# Patient Record
Sex: FEMALE | Race: WHITE | NOT HISPANIC OR LATINO
[De-identification: names, ages, dates, MRNs, and addresses within clinical notes are randomized per-mention and may not be internally consistent; named-entity substitution may affect disease eponyms.]

---

## 2017-01-27 ENCOUNTER — RECORD ABSTRACTING (OUTPATIENT)
Age: 47
End: 2017-01-27

## 2017-01-27 DIAGNOSIS — D23.5 OTHER BENIGN NEOPLASM OF SKIN OF TRUNK: ICD-10-CM

## 2017-01-27 DIAGNOSIS — L03.211 CELLULITIS OF FACE: ICD-10-CM

## 2017-01-27 DIAGNOSIS — F17.200 NICOTINE DEPENDENCE, UNSPECIFIED, UNCOMPLICATED: ICD-10-CM

## 2017-01-27 PROBLEM — Z00.00 ENCOUNTER FOR PREVENTIVE HEALTH EXAMINATION: Status: ACTIVE | Noted: 2017-01-27

## 2017-09-23 ENCOUNTER — EMERGENCY (EMERGENCY)
Facility: HOSPITAL | Age: 47
LOS: 0 days | Discharge: HOME | End: 2017-09-23
Admitting: INTERNAL MEDICINE

## 2017-09-23 DIAGNOSIS — N61.1 ABSCESS OF THE BREAST AND NIPPLE: ICD-10-CM

## 2017-09-23 DIAGNOSIS — Z87.891 PERSONAL HISTORY OF NICOTINE DEPENDENCE: ICD-10-CM

## 2017-10-25 ENCOUNTER — EMERGENCY (EMERGENCY)
Facility: HOSPITAL | Age: 47
LOS: 0 days | Discharge: HOME | End: 2017-10-25

## 2017-10-25 DIAGNOSIS — T25.221A BURN OF SECOND DEGREE OF RIGHT FOOT, INITIAL ENCOUNTER: ICD-10-CM

## 2017-10-25 DIAGNOSIS — Z23 ENCOUNTER FOR IMMUNIZATION: ICD-10-CM

## 2017-10-25 DIAGNOSIS — Z88.5 ALLERGY STATUS TO NARCOTIC AGENT: ICD-10-CM
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
## 2017-10-25 DIAGNOSIS — Z87.891 PERSONAL HISTORY OF NICOTINE DEPENDENCE: ICD-10-CM

## 2017-10-25 DIAGNOSIS — T21.12XA BURN OF FIRST DEGREE OF ABDOMINAL WALL, INITIAL ENCOUNTER: ICD-10-CM
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
## 2020-07-08 ENCOUNTER — RESULT REVIEW (OUTPATIENT)
Age: 50
End: 2020-07-08

## 2020-08-28 ENCOUNTER — APPOINTMENT (OUTPATIENT)
Dept: NEUROLOGY | Facility: CLINIC | Age: 50
End: 2020-08-28
Payer: COMMERCIAL

## 2020-08-28 VITALS
BODY MASS INDEX: 33.82 KG/M2 | TEMPERATURE: 97.9 F | HEIGHT: 65 IN | WEIGHT: 203 LBS | HEART RATE: 108 BPM | SYSTOLIC BLOOD PRESSURE: 120 MMHG | OXYGEN SATURATION: 97 % | DIASTOLIC BLOOD PRESSURE: 76 MMHG

## 2020-08-28 DIAGNOSIS — E78.5 HYPERLIPIDEMIA, UNSPECIFIED: ICD-10-CM

## 2020-08-28 DIAGNOSIS — Z82.49 FAMILY HISTORY OF ISCHEMIC HEART DISEASE AND OTHER DISEASES OF THE CIRCULATORY SYSTEM: ICD-10-CM

## 2020-08-28 DIAGNOSIS — F17.210 NICOTINE DEPENDENCE, CIGARETTES, UNCOMPLICATED: ICD-10-CM

## 2020-08-28 DIAGNOSIS — E11.9 TYPE 2 DIABETES MELLITUS W/OUT COMPLICATIONS: ICD-10-CM

## 2020-08-28 DIAGNOSIS — Z83.3 FAMILY HISTORY OF DIABETES MELLITUS: ICD-10-CM

## 2020-08-28 DIAGNOSIS — E03.9 HYPOTHYROIDISM, UNSPECIFIED: ICD-10-CM

## 2020-08-28 DIAGNOSIS — E04.2 NONTOXIC MULTINODULAR GOITER: ICD-10-CM

## 2020-08-28 DIAGNOSIS — Z82.0 FAMILY HISTORY OF EPILEPSY AND OTHER DISEASES OF THE NERVOUS SYSTEM: ICD-10-CM

## 2020-08-28 PROCEDURE — 99204 OFFICE O/P NEW MOD 45 MIN: CPT

## 2020-08-28 RX ORDER — SULFAMETHOXAZOLE AND TRIMETHOPRIM 800; 160 MG/1; MG/1
TABLET ORAL
Refills: 0 | Status: DISCONTINUED | COMMUNITY
End: 2020-08-28

## 2020-08-28 RX ORDER — CLINDAMYCIN HYDROCHLORIDE 300 MG/1
CAPSULE ORAL
Refills: 0 | Status: DISCONTINUED | COMMUNITY
End: 2020-08-28

## 2020-08-28 NOTE — REVIEW OF SYSTEMS
[Feeling Poorly] : feeling poorly [Feeling Tired] : feeling tired [Recent Weight Loss (___ Lbs)] : recent [unfilled] ~Ulb weight loss [Confused or Disoriented] : confusion [Decr. Concentrating Ability] : decreased concentrating ability [Memory Lapses or Loss] : memory loss [Vertigo] : vertigo [Migraine Headache] : migraine headaches [Tension Headache] : tension-type headaches [Sleep Disturbances] : sleep disturbances [Heart Rate Is Fast] : fast heart rate [Vomiting] : vomiting [Heartburn] : heartburn [Joint Pain] : joint pain [Hot Flashes] : hot flashes [Fever] : no fever [Chills] : no chills [Recent Weight Gain (___ Lbs)] : no recent weight gain [Arm Weakness] : no arm weakness [Facial Weakness] : no facial weakness [Leg Weakness] : no leg weakness [Poor Coordination] : good coordination [Hand Weakness] : no hand weakness [Numbness] : no numbness [Tingling] : no tingling [Seizures] : no convulsions [Fainting] : no fainting [Lightheadedness] : no lightheadedness [Dizziness] : no dizziness [Cluster Headache] : no cluster headache [Frequent Falls] : not falling [Difficulty Walking] : no difficulty walking [Depression] : no depression [Anxiety] : no anxiety [Suicidal] : not suicidal [Earache] : no earache [Eye Pain] : no eye pain [Eyesight Problems] : no eyesight problems [Loss Of Hearing] : no hearing loss [Heart Rate Is Slow] : the heart rate was not slow [Chest Pain] : no chest pain [Shortness Of Breath] : no shortness of breath [Palpitations] : no palpitations [Wheezing] : no wheezing [Abdominal Pain] : no abdominal pain [Constipation] : no constipation [Cough] : no cough [Melena] : no melena [Dysuria] : no dysuria [Diarrhea] : no diarrhea [Joint Swelling] : no joint swelling [Incontinence] : no incontinence [Joint Stiffness] : no joint stiffness [Skin Lesions] : no skin lesions [Skin Wound] : no skin wound [Muscle Weakness] : no muscle weakness [Easy Bruising] : no tendency for easy bruising [Easy Bleeding] : no tendency for easy bleeding [FreeTextEntry9] : ankles [de-identified] : falls asleep, but gets up a lot.  Snores on back.  Sleep apnea test is negative [de-identified] : states "ADD big time, all life, noties more as she gets older" [de-identified] : perimenopausal x 3 years

## 2020-08-28 NOTE — ASSESSMENT
[FreeTextEntry1] : Vertigo and migraines.  I was able to produce positional vertigo today and treated her for LEFT BPPV.  This surprised her because she had always associated the symptoms as occuring when she turn from left to right side.  I treated the most symptomatic position today.  \par \par Other potential causes of vertigo would include migraine or CNS disorder such as demyelinating disease.  She makes reference to abnormal brain MRI years ago so will recommend MRI brain w/wo contrast.  She is unlikely to tolerate supine MRI so I've requested stand up MRI.\par \par Return in 3 months.  Sooner if needed for example if positional vertigo is worse may return for Epley in a couple of weeks.

## 2020-08-28 NOTE — PHYSICAL EXAM
[FreeTextEntry1] : Recent and remote memory, general fund of knowledge, attention, concentration, and language function were intact.  Pupils are equal, round and reactive to light and accommodation.  Funduscopic exam did not show any papilledema.  Visual fields are intact to confrontation.  Extraocular movements are full.  There is no nystagmus.  The facial muscles are symmetric.  Facial sensation is intact to light touch, temperature, and pinprick.  Hearing is intact to finger rub bilaterally.  Tongue is midline.  Palate elevates symmetrically.  Neck is supple.  There is no meningismus.  Shoulder shrugs are symmetric.  Motor exam demonstrates 5/5 strength in the proximal and distal muscles of the upper and lower extremities.  Tone and bulk were normal.  There is no pronator drift.  Reflexes are 2+ bilaterally in the biceps, triceps, brachioradialis, patellae and ankles.  Toes are downgoing.  There is no clonus.  Coordination demonstrates normal finger-to-nose, heel-to-shin and rapid alternating movements.  Gait demonstrates normal heel and toe walk.  Tandem gait is normal.  Sensory exam, normal light touch, pinprick, vibration sensation in upper and lower extremities. \par \par [place] positive with head tilted back and to right though I didn't see rotatory nystagmus with that.\par [place] was more strongly positive with head tilted back and to the left and prominent nystagmus seen in that position.\par \par Horizontal canal maneuvers were negative.\par \par \par The patient is well-developed, well-nourished female in no acute distress.  Cardiac exam demonstrates a regular rate and rhythm.  No murmurs.  Carotids are 2+ bilaterally.  No bruits.  Abdomen is soft, nontender, and nondistended.  Bowel sounds are present.  Extremities showed no clubbing, cyanosis or edema. \par \par \par

## 2020-08-28 NOTE — HISTORY OF PRESENT ILLNESS
[FreeTextEntry1] : Pt is 49 yo RH female stating last 1.5 years nonstop spinning then stopped a few days ago.  States head trauma.  1992 fell on head, dazed, large bump on head.  1994 saw neurologist and told scar tissue in center of brain.  Mother states she had vomiting every morning.  States when lays on right worse. \par \par Taking bonine for vertigo, it stopped working.  She has migraine depending on her sleeping, can't see, phono/phonophobia, N/V.  Frequency not often now. \par \par Mother states she had diabetes and put on diet and since that is doing better (endocrinologist [address])   Lumps in neck dx'd with low functionin thyroid and put on levoxy and on diabetic diet - high protein, low carb.  Losing weight 10 lbs over 2 weeks she has been on it.\par \par

## 2020-11-20 ENCOUNTER — APPOINTMENT (OUTPATIENT)
Dept: NEUROLOGY | Facility: CLINIC | Age: 50
End: 2020-11-20

## 2021-07-30 ENCOUNTER — EMERGENCY (EMERGENCY)
Facility: HOSPITAL | Age: 51
LOS: 0 days | Discharge: HOME | End: 2021-07-30
Attending: STUDENT IN AN ORGANIZED HEALTH CARE EDUCATION/TRAINING PROGRAM | Admitting: STUDENT IN AN ORGANIZED HEALTH CARE EDUCATION/TRAINING PROGRAM
Payer: COMMERCIAL

## 2021-07-30 VITALS
OXYGEN SATURATION: 99 % | RESPIRATION RATE: 20 BRPM | HEART RATE: 91 BPM | TEMPERATURE: 97 F | DIASTOLIC BLOOD PRESSURE: 89 MMHG | SYSTOLIC BLOOD PRESSURE: 159 MMHG

## 2021-07-30 DIAGNOSIS — E11.9 TYPE 2 DIABETES MELLITUS WITHOUT COMPLICATIONS: ICD-10-CM

## 2021-07-30 DIAGNOSIS — R10.13 EPIGASTRIC PAIN: ICD-10-CM

## 2021-07-30 DIAGNOSIS — R11.2 NAUSEA WITH VOMITING, UNSPECIFIED: ICD-10-CM

## 2021-07-30 DIAGNOSIS — E03.9 HYPOTHYROIDISM, UNSPECIFIED: ICD-10-CM

## 2021-07-30 DIAGNOSIS — R63.8 OTHER SYMPTOMS AND SIGNS CONCERNING FOOD AND FLUID INTAKE: ICD-10-CM

## 2021-07-30 DIAGNOSIS — F17.200 NICOTINE DEPENDENCE, UNSPECIFIED, UNCOMPLICATED: ICD-10-CM

## 2021-07-30 LAB
ALBUMIN SERPL ELPH-MCNC: 4.3 G/DL — SIGNIFICANT CHANGE UP (ref 3.5–5.2)
ALP SERPL-CCNC: 66 U/L — SIGNIFICANT CHANGE UP (ref 30–115)
ALT FLD-CCNC: 22 U/L — SIGNIFICANT CHANGE UP (ref 0–41)
ANION GAP SERPL CALC-SCNC: 10 MMOL/L — SIGNIFICANT CHANGE UP (ref 7–14)
APPEARANCE UR: CLEAR — SIGNIFICANT CHANGE UP
AST SERPL-CCNC: 14 U/L — SIGNIFICANT CHANGE UP (ref 0–41)
BASE EXCESS BLDV CALC-SCNC: 4.4 MMOL/L — HIGH (ref -2–2)
BASOPHILS # BLD AUTO: 0.05 K/UL — SIGNIFICANT CHANGE UP (ref 0–0.2)
BASOPHILS NFR BLD AUTO: 0.5 % — SIGNIFICANT CHANGE UP (ref 0–1)
BILIRUB SERPL-MCNC: 0.3 MG/DL — SIGNIFICANT CHANGE UP (ref 0.2–1.2)
BILIRUB UR-MCNC: NEGATIVE — SIGNIFICANT CHANGE UP
BUN SERPL-MCNC: 19 MG/DL — SIGNIFICANT CHANGE UP (ref 10–20)
CA-I SERPL-SCNC: 1.19 MMOL/L — SIGNIFICANT CHANGE UP (ref 1.12–1.3)
CALCIUM SERPL-MCNC: 9.2 MG/DL — SIGNIFICANT CHANGE UP (ref 8.5–10.1)
CHLORIDE SERPL-SCNC: 103 MMOL/L — SIGNIFICANT CHANGE UP (ref 98–110)
CO2 SERPL-SCNC: 25 MMOL/L — SIGNIFICANT CHANGE UP (ref 17–32)
COLOR SPEC: SIGNIFICANT CHANGE UP
CREAT SERPL-MCNC: 0.5 MG/DL — LOW (ref 0.7–1.5)
DIFF PNL FLD: NEGATIVE — SIGNIFICANT CHANGE UP
EOSINOPHIL # BLD AUTO: 0.07 K/UL — SIGNIFICANT CHANGE UP (ref 0–0.7)
EOSINOPHIL NFR BLD AUTO: 0.6 % — SIGNIFICANT CHANGE UP (ref 0–8)
GAS PNL BLDV: 141 MMOL/L — SIGNIFICANT CHANGE UP (ref 136–145)
GAS PNL BLDV: SIGNIFICANT CHANGE UP
GLUCOSE SERPL-MCNC: 97 MG/DL — SIGNIFICANT CHANGE UP (ref 70–99)
GLUCOSE UR QL: NEGATIVE — SIGNIFICANT CHANGE UP
HCG SERPL QL: NEGATIVE — SIGNIFICANT CHANGE UP
HCO3 BLDV-SCNC: 29 MMOL/L — SIGNIFICANT CHANGE UP (ref 22–29)
HCT VFR BLD CALC: 43 % — SIGNIFICANT CHANGE UP (ref 37–47)
HCT VFR BLDA CALC: 44.5 % — HIGH (ref 34–44)
HGB BLD CALC-MCNC: 14.5 G/DL — SIGNIFICANT CHANGE UP (ref 14–18)
HGB BLD-MCNC: 14.2 G/DL — SIGNIFICANT CHANGE UP (ref 12–16)
IMM GRANULOCYTES NFR BLD AUTO: 0.5 % — HIGH (ref 0.1–0.3)
KETONES UR-MCNC: NEGATIVE — SIGNIFICANT CHANGE UP
LACTATE BLDV-MCNC: 0.6 MMOL/L — SIGNIFICANT CHANGE UP (ref 0.5–1.6)
LEUKOCYTE ESTERASE UR-ACNC: NEGATIVE — SIGNIFICANT CHANGE UP
LIDOCAIN IGE QN: 35 U/L — SIGNIFICANT CHANGE UP (ref 7–60)
LYMPHOCYTES # BLD AUTO: 2.59 K/UL — SIGNIFICANT CHANGE UP (ref 1.2–3.4)
LYMPHOCYTES # BLD AUTO: 23.8 % — SIGNIFICANT CHANGE UP (ref 20.5–51.1)
MCHC RBC-ENTMCNC: 27.4 PG — SIGNIFICANT CHANGE UP (ref 27–31)
MCHC RBC-ENTMCNC: 33 G/DL — SIGNIFICANT CHANGE UP (ref 32–37)
MCV RBC AUTO: 83 FL — SIGNIFICANT CHANGE UP (ref 81–99)
MONOCYTES # BLD AUTO: 0.87 K/UL — HIGH (ref 0.1–0.6)
MONOCYTES NFR BLD AUTO: 8 % — SIGNIFICANT CHANGE UP (ref 1.7–9.3)
NEUTROPHILS # BLD AUTO: 7.26 K/UL — HIGH (ref 1.4–6.5)
NEUTROPHILS NFR BLD AUTO: 66.6 % — SIGNIFICANT CHANGE UP (ref 42.2–75.2)
NITRITE UR-MCNC: NEGATIVE — SIGNIFICANT CHANGE UP
NRBC # BLD: 0 /100 WBCS — SIGNIFICANT CHANGE UP (ref 0–0)
PCO2 BLDV: 44 MMHG — SIGNIFICANT CHANGE UP (ref 41–51)
PH BLDV: 7.44 — HIGH (ref 7.26–7.43)
PH UR: 6.5 — SIGNIFICANT CHANGE UP (ref 5–8)
PLATELET # BLD AUTO: 251 K/UL — SIGNIFICANT CHANGE UP (ref 130–400)
PO2 BLDV: 61 MMHG — HIGH (ref 20–40)
POTASSIUM BLDV-SCNC: 3.7 MMOL/L — SIGNIFICANT CHANGE UP (ref 3.3–5.6)
POTASSIUM SERPL-MCNC: 4 MMOL/L — SIGNIFICANT CHANGE UP (ref 3.5–5)
POTASSIUM SERPL-SCNC: 4 MMOL/L — SIGNIFICANT CHANGE UP (ref 3.5–5)
PROT SERPL-MCNC: 6.4 G/DL — SIGNIFICANT CHANGE UP (ref 6–8)
PROT UR-MCNC: SIGNIFICANT CHANGE UP
RBC # BLD: 5.18 M/UL — SIGNIFICANT CHANGE UP (ref 4.2–5.4)
RBC # FLD: 13.6 % — SIGNIFICANT CHANGE UP (ref 11.5–14.5)
SAO2 % BLDV: 93 % — SIGNIFICANT CHANGE UP
SODIUM SERPL-SCNC: 138 MMOL/L — SIGNIFICANT CHANGE UP (ref 135–146)
SP GR SPEC: >1.05 (ref 1.01–1.03)
TROPONIN T SERPL-MCNC: <0.01 NG/ML — SIGNIFICANT CHANGE UP
UROBILINOGEN FLD QL: SIGNIFICANT CHANGE UP
WBC # BLD: 10.89 K/UL — HIGH (ref 4.8–10.8)
WBC # FLD AUTO: 10.89 K/UL — HIGH (ref 4.8–10.8)

## 2021-07-30 PROCEDURE — 93010 ELECTROCARDIOGRAM REPORT: CPT

## 2021-07-30 PROCEDURE — 99285 EMERGENCY DEPT VISIT HI MDM: CPT

## 2021-07-30 PROCEDURE — 74177 CT ABD & PELVIS W/CONTRAST: CPT | Mod: 26,MA

## 2021-07-30 RX ORDER — SUCRALFATE 1 G
1 TABLET ORAL ONCE
Refills: 0 | Status: COMPLETED | OUTPATIENT
Start: 2021-07-30 | End: 2021-07-30

## 2021-07-30 RX ORDER — FAMOTIDINE 10 MG/ML
20 INJECTION INTRAVENOUS ONCE
Refills: 0 | Status: COMPLETED | OUTPATIENT
Start: 2021-07-30 | End: 2021-07-30

## 2021-07-30 RX ORDER — PANTOPRAZOLE SODIUM 20 MG/1
40 TABLET, DELAYED RELEASE ORAL ONCE
Refills: 0 | Status: COMPLETED | OUTPATIENT
Start: 2021-07-30 | End: 2021-07-30

## 2021-07-30 RX ORDER — METOCLOPRAMIDE HCL 10 MG
10 TABLET ORAL ONCE
Refills: 0 | Status: COMPLETED | OUTPATIENT
Start: 2021-07-30 | End: 2021-07-30

## 2021-07-30 RX ORDER — PANTOPRAZOLE SODIUM 20 MG/1
1 TABLET, DELAYED RELEASE ORAL
Qty: 14 | Refills: 0
Start: 2021-07-30 | End: 2021-08-12

## 2021-07-30 RX ORDER — ONDANSETRON 8 MG/1
4 TABLET, FILM COATED ORAL ONCE
Refills: 0 | Status: COMPLETED | OUTPATIENT
Start: 2021-07-30 | End: 2021-07-30

## 2021-07-30 RX ORDER — SODIUM CHLORIDE 9 MG/ML
2000 INJECTION, SOLUTION INTRAVENOUS ONCE
Refills: 0 | Status: COMPLETED | OUTPATIENT
Start: 2021-07-30 | End: 2021-07-30

## 2021-07-30 RX ORDER — SUCRALFATE 1 G
10 TABLET ORAL
Qty: 500 | Refills: 0
Start: 2021-07-30

## 2021-07-30 RX ADMIN — Medication 1 GRAM(S): at 17:07

## 2021-07-30 RX ADMIN — SODIUM CHLORIDE 2000 MILLILITER(S): 9 INJECTION, SOLUTION INTRAVENOUS at 13:54

## 2021-07-30 RX ADMIN — Medication 10 MILLIGRAM(S): at 16:29

## 2021-07-30 RX ADMIN — FAMOTIDINE 20 MILLIGRAM(S): 10 INJECTION INTRAVENOUS at 13:54

## 2021-07-30 RX ADMIN — PANTOPRAZOLE SODIUM 40 MILLIGRAM(S): 20 TABLET, DELAYED RELEASE ORAL at 17:08

## 2021-07-30 RX ADMIN — ONDANSETRON 4 MILLIGRAM(S): 8 TABLET, FILM COATED ORAL at 17:06

## 2021-07-30 NOTE — ED ADULT NURSE NOTE - OBJECTIVE STATEMENT
C/o abd pain with nausea and vomiting x 3 days. Pt states hx of diabetes, noncompliant with medication. Also c/o decrease oral intake.

## 2021-07-30 NOTE — ED PROVIDER NOTE - OBJECTIVE STATEMENT
50 yo f hx DM, hypothyroid (med noncompliant for past month)  pt presents for eval of AP/N/V. sx started 3 days ago as epigastric ap. the following day, pt started feeling nauseous and vomited. sx intermittent. pt w/ decreasesd PO intake 2/2 sx. no f/c/urinary complaints. no cp or sob. no hx abd surgeries

## 2021-07-30 NOTE — ED PROVIDER NOTE - PATIENT PORTAL LINK FT
You can access the FollowMyHealth Patient Portal offered by [facility] by registering at the following website: [email or web address]. By joining [facility]’s FollowMyHealth portal, you will also be able to view your health information using other applications (apps) compatible with our system.

## 2021-07-30 NOTE — ED ADULT TRIAGE NOTE - CHIEF COMPLAINT QUOTE
"I've had bad abdominal pain since Tuesday and I've been vomiting since Wednesday. It's all bile." Pt's family member states pt has been off DM and Thyroid medication for over 1 month.

## 2021-07-30 NOTE — ED PROVIDER NOTE - CLINICAL SUMMARY MEDICAL DECISION MAKING FREE TEXT BOX
pt w/ epigastric AP, n,v. ed w/u showing gastritis/pud. no e/o other intra-abd pathology, acs r/o, pt tolerating PO. will dc w/ gi f/u

## 2021-07-30 NOTE — ED PROVIDER NOTE - CARE PROVIDER_API CALL
Dali Gibson (MD)  Gastroenterology  [address] [place] [address]  Phone: [phone]  Fax: [phone]  Follow Up Time:

## 2021-07-30 NOTE — ED PROVIDER NOTE - PHYSICAL EXAMINATION
PHYSICAL EXAM:    Constitutional: awake, alert, NAD  Eyes: EOMI, no conj injection  HENT: NC AT  Respiratory: no respiratory distress, breath sounds equal b/l, no wheezing, rhonchi or stridor.   Cardiovascular: RRR nml S1S2  Gastrointestinal: soft, no masses, mild epigastric ttp, nondistended. No guarding or rebound. negative murphys  Extremities: no peripheral edema  Neurological: AAOx3, CN II-XII grossly intact, no focal numbness or weakness  Skin: no rash  Musculoskeletal: no gross deformity

## 2021-07-30 NOTE — ED PROVIDER NOTE - PROGRESS NOTE DETAILS
r/o gastritis, pancreatitis, hb disease, colitis, less likely atypical acs- will get screening ekg/trop x1, belly labs, ctap, ua,  will provide supportive care, serial exam and ED observation period serial abd exam benign. pt hungry

## 2021-07-30 NOTE — ED PROVIDER NOTE - NS ED ROS FT
Constitutional: no fever or rigors  Eyes: no eye redness, acute visual change  ENMT: no ear pain, no throat pain  Card: no chest pain, no palpitations  Pulm: no cough, no shortness of breath  GI: pos abdominal pain, nausea and vomiting  : no dysuria or hematuria  MSK: no limitation in range of motion, no neck pain  Skin: no rash, no abrasion  Neuro: no numbness, no weakness  Heme/Onc: no easy bruising, no bleeding tendency   Allergic: no hives, no throat swelling

## 2021-07-30 NOTE — ED PROVIDER NOTE - NSFOLLOWUPINSTRUCTIONS_ED_ALL_ED_FT
Take home meds as prescribed. Take protonix and carafate for gastritis. Follow up with your primary care doctor in 2-3 days. Follow up with gastroenterology within 1 week for reassessment.    Abdominal Pain    Many things can cause abdominal pain. Many times, abdominal pain is not caused by a disease and will improve without treatment. Your health care provider will do a physical exam to determine if there is a dangerous cause of your pain; blood tests and imaging may help determine the cause of your pain. However, in many cases, no cause may be found and you may need further testing as an outpatient. Monitor your abdominal pain for any changes.     SEEK IMMEDIATE MEDICAL CARE IF YOU HAVE ANY OF THE FOLLOWING SYMPTOMS: worsening abdominal pain, uncontrollable vomiting, profuse diarrhea, inability to have bowel movements or pass gas, black or bloody stools, fever accompanying chest pain or back pain, or fainting. These symptoms may represent a serious problem that is an emergency. Do not wait to see if the symptoms will go away. Get medical help right away. Call 911 and do not drive yourself to the hospital.

## 2021-12-10 ENCOUNTER — APPOINTMENT (OUTPATIENT)
Dept: NEUROLOGY | Facility: CLINIC | Age: 51
End: 2021-12-10
Payer: COMMERCIAL

## 2021-12-10 VITALS
SYSTOLIC BLOOD PRESSURE: 139 MMHG | DIASTOLIC BLOOD PRESSURE: 85 MMHG | HEART RATE: 99 BPM | WEIGHT: 207 LBS | BODY MASS INDEX: 34.49 KG/M2 | TEMPERATURE: 98.1 F | HEIGHT: 65 IN | OXYGEN SATURATION: 98 %

## 2021-12-10 PROBLEM — E03.9 HYPOTHYROIDISM, UNSPECIFIED: Chronic | Status: ACTIVE | Noted: 2021-07-30

## 2021-12-10 PROBLEM — E11.9 TYPE 2 DIABETES MELLITUS WITHOUT COMPLICATIONS: Chronic | Status: ACTIVE | Noted: 2021-07-30

## 2021-12-10 PROCEDURE — 99214 OFFICE O/P EST MOD 30 MIN: CPT

## 2021-12-10 RX ORDER — LEVOTHYROXINE SODIUM 0.07 MG/1
75 TABLET ORAL
Refills: 0 | Status: DISCONTINUED | COMMUNITY
End: 2021-12-10

## 2021-12-10 RX ORDER — METFORMIN HYDROCHLORIDE 500 MG/1
500 TABLET, COATED ORAL
Refills: 0 | Status: DISCONTINUED | COMMUNITY
End: 2021-12-10

## 2021-12-10 RX ORDER — DULAGLUTIDE 1.5 MG/.5ML
1.5 INJECTION, SOLUTION SUBCUTANEOUS
Refills: 0 | Status: DISCONTINUED | COMMUNITY
End: 2021-12-10

## 2021-12-10 NOTE — ASSESSMENT
[FreeTextEntry1] : 50 y/o F with PMH of DM, hypothyroidism presented for follow up. SHe is still experiencing dizziness on turning head, worse in the morning. It is more on turning left to right, lasting for about a minute. Symptoms getting worse when she is stressed. Other associated symptoms: dull headache, brain fog. On exam: no nystagmus, no dysmetria or ataxia.MRI of brain: some hyperintensities in subcortical white matter.  Previous visit, Epley was performed, which lasted for 2-3 days, then vertigo recurred. This could be migraine associated vertigo/ migraine with aura. She may benefit from , being on a migraine maintenance medication. Other constitutional symptom may attribute to her other medical condition. She doesn't have any PCP now. Advised patient to establish care with a Primary care provider to evaluate her DM,and hypothyroidism. Patient had sleep study done where she was told "hyperactivity?" in sleep.May have restless leg syndrome.\par \par - Will Check Ferritin, Iron, TSH, HbA1c, Vit-B12, Folate\par - She was on Requip in the past, discussion to restart Requip with patient, patient not keen on starting on any meds.\par - Discussion to try migraine maintenance therapy like Topamax, will hold for now.   Advised to start magnesium oxide 400 mg/day and riboflavin (vit b2) 400 mg/day.\par - Patient is under a lot of stress in fear of losing job, not keen on taking any medication.\par - Danielle-Hallpike maneuver performed, vertigo induced on both side, lasted only for brief 2-3 seconds.\par - Epley Maneuver performed at office\par - Referral to Vestibular rehab.\par - Will obtian CTA head and neck w/wo contrast to r/o VBI.\par - Referral to  for stress management\par - Smoking cessation counselling, minimize caffeine intake.\par - Patient is advised to consult ENT to r/o any other pathology contributing to vertigo and to get audiogram.  Smoking cessation and decrease in caffeine intake was recommended.

## 2021-12-10 NOTE — HISTORY OF PRESENT ILLNESS
[FreeTextEntry1] : 52 y/o F has presented to office for follow up. She is still experiencing dizziness/room spinning sensation intermittently. It occurs when she turns her head. It is mostly worse in the morning making it difficult for her to get up from the bed. She also c/o head " heaviness" , she describes it as not exactly headache, but" someone is squashing my head". She also has been experiencing brain fog, difficulty remembering stuff. Denies any weakness, paresthesia. She stopped taking her DM and thyroid medication by herself, didn't go to her PCP/Endocrine. \par She also c/o urgency but no incontinence, c/o weight gain, tiredness.

## 2021-12-10 NOTE — PHYSICAL EXAM
[Person] : oriented to person [Place] : oriented to place [Time] : oriented to time [Cranial Nerves Optic (II)] : visual acuity intact bilaterally,  visual fields full to confrontation, pupils equal round and reactive to light [Cranial Nerves Oculomotor (III)] : extraocular motion intact [Cranial Nerves Trigeminal (V)] : facial sensation intact symmetrically [Cranial Nerves Facial (VII)] : face symmetrical [Motor Tone] : muscle tone was normal in all four extremities [Motor Strength] : muscle strength was normal in all four extremities [Involuntary Movements] : no involuntary movements were seen [Motor Handedness Right-Handed] : the patient is right hand dominant [Sensation Tactile Decrease] : light touch was intact [Abnormal Walk] : normal gait

## 2021-12-10 NOTE — END OF VISIT
[] : Resident [FreeTextEntry3] : Pt examined.  Positional vertigo trigger with head tilted back and to the right more than when head tilted back and to the left.\par Epley maneuver performed.  Due to multiple positions triggering the vertigo tierney with head tilted back will get CTA head/neck to assess for\par stenosis in vertebrobasilar system.  Smoking cessation was discussed with patient.  Patient encouraged to call for PCP appointment.

## 2021-12-10 NOTE — REASON FOR VISIT
[Follow-Up: _____] : a [unfilled] follow-up visit [Family Member] : family member [FreeTextEntry1] : dizziness

## 2022-01-28 LAB
ALBUMIN SERPL ELPH-MCNC: 4.5 G/DL
ALP BLD-CCNC: 79 U/L
ALT SERPL-CCNC: 40 U/L
ANION GAP SERPL CALC-SCNC: 11 MMOL/L
AST SERPL-CCNC: 24 U/L
BILIRUB SERPL-MCNC: 0.3 MG/DL
BUN SERPL-MCNC: 22 MG/DL
CALCIUM SERPL-MCNC: 9.7 MG/DL
CHLORIDE SERPL-SCNC: 101 MMOL/L
CO2 SERPL-SCNC: 26 MMOL/L
CREAT SERPL-MCNC: 0.6 MG/DL
GLUCOSE SERPL-MCNC: 154 MG/DL
POTASSIUM SERPL-SCNC: 4.3 MMOL/L
PROT SERPL-MCNC: 7.1 G/DL
SODIUM SERPL-SCNC: 138 MMOL/L

## 2022-06-13 ENCOUNTER — RESULT REVIEW (OUTPATIENT)
Age: 52
End: 2022-06-13

## 2022-06-13 ENCOUNTER — TRANSCRIPTION ENCOUNTER (OUTPATIENT)
Age: 52
End: 2022-06-13

## 2022-06-13 ENCOUNTER — OUTPATIENT (OUTPATIENT)
Dept: OUTPATIENT SERVICES | Facility: HOSPITAL | Age: 52
LOS: 1 days | Discharge: HOME | End: 2022-06-13
Payer: COMMERCIAL

## 2022-06-13 VITALS
RESPIRATION RATE: 24 BRPM | HEART RATE: 79 BPM | DIASTOLIC BLOOD PRESSURE: 63 MMHG | SYSTOLIC BLOOD PRESSURE: 108 MMHG | OXYGEN SATURATION: 97 %

## 2022-06-13 VITALS
HEART RATE: 90 BPM | WEIGHT: 216.05 LBS | SYSTOLIC BLOOD PRESSURE: 137 MMHG | TEMPERATURE: 97 F | RESPIRATION RATE: 18 BRPM | DIASTOLIC BLOOD PRESSURE: 77 MMHG | HEIGHT: 65 IN

## 2022-06-13 DIAGNOSIS — Z87.2 PERSONAL HISTORY OF DISEASES OF THE SKIN AND SUBCUTANEOUS TISSUE: Chronic | ICD-10-CM

## 2022-06-13 LAB — GLUCOSE BLDC GLUCOMTR-MCNC: 138 MG/DL — HIGH (ref 70–99)

## 2022-06-13 PROCEDURE — 88305 TISSUE EXAM BY PATHOLOGIST: CPT | Mod: 26

## 2022-06-13 PROCEDURE — 88312 SPECIAL STAINS GROUP 1: CPT | Mod: 26

## 2022-06-13 RX ORDER — DULAGLUTIDE 4.5 MG/.5ML
0 INJECTION, SOLUTION SUBCUTANEOUS
Qty: 0 | Refills: 0 | DISCHARGE

## 2022-06-13 RX ORDER — METFORMIN HYDROCHLORIDE 850 MG/1
2000 TABLET ORAL
Qty: 0 | Refills: 0 | DISCHARGE

## 2022-06-13 RX ORDER — LEVOTHYROXINE SODIUM 125 MCG
1 TABLET ORAL
Qty: 0 | Refills: 0 | DISCHARGE

## 2022-06-13 NOTE — ASU DISCHARGE PLAN (ADULT/PEDIATRIC) - NS MD DC FALL RISK RISK
For information on Fall & Injury Prevention, visit: [email or web address] OR  [email or web address] OR  https://www.cdc.gov/steadi/patient.html

## 2022-06-13 NOTE — ASU PATIENT PROFILE, ADULT - FALL HARM RISK - UNIVERSAL INTERVENTIONS
Bed in lowest position, wheels locked, appropriate side rails in place/Call bell, personal items and telephone in reach/Instruct patient to call for assistance before getting out of bed or chair/Non-slip footwear when patient is out of bed/[place] to call system/Physically safe environment - no spills, clutter or unnecessary equipment/Purposeful Proactive Rounding/Room/bathroom lighting operational, light cord in reach

## 2022-06-13 NOTE — CHART NOTE - NSCHARTNOTEFT_GEN_A_CORE
PACU ANESTHESIA ADMISSION NOTE      Procedure: EGD  Post op diagnosis:  GERD        __x__  Patent Airway    __x__  Full return of protective reflexes    _x___  Full recovery from anesthesia / back to baseline     Vitals:   T:  97         R: 18                 BP:  104/58                Sat:  100%                 P: 89      Mental Status:  __x__ Awake   _____ Alert   _____ Drowsy   _____ Sedated    Nausea/Vomiting:  ____ NO  ______Yes,   See Post - Op Orders          Pain Scale (0-10):  _____    Treatment: ____ None    ___x_ See Post - Op/PCA Orders    Post - Operative Fluids:   ____ Oral   __x__ See Post - Op Orders    Plan: Discharge:   _x___Home       _____Floor     _____Critical Care    _____  Other:_________________    Comments: Pt tolerated procedure well, no anesthesia related complications. Care of pt endorsed to PACU, report given to PACU RN. Discharge when criteria are met.

## 2022-06-14 LAB — SURGICAL PATHOLOGY STUDY: SIGNIFICANT CHANGE UP

## 2022-06-15 DIAGNOSIS — Z88.5 ALLERGY STATUS TO NARCOTIC AGENT: ICD-10-CM

## 2022-06-15 DIAGNOSIS — K21.9 GASTRO-ESOPHAGEAL REFLUX DISEASE WITHOUT ESOPHAGITIS: ICD-10-CM

## 2022-06-15 DIAGNOSIS — R11.2 NAUSEA WITH VOMITING, UNSPECIFIED: ICD-10-CM

## 2022-06-15 DIAGNOSIS — E03.9 HYPOTHYROIDISM, UNSPECIFIED: ICD-10-CM

## 2022-06-15 DIAGNOSIS — K29.80 DUODENITIS WITHOUT BLEEDING: ICD-10-CM

## 2022-06-15 DIAGNOSIS — K29.50 UNSPECIFIED CHRONIC GASTRITIS WITHOUT BLEEDING: ICD-10-CM

## 2022-06-15 DIAGNOSIS — E11.9 TYPE 2 DIABETES MELLITUS WITHOUT COMPLICATIONS: ICD-10-CM

## 2022-06-15 DIAGNOSIS — Z79.84 LONG TERM (CURRENT) USE OF ORAL HYPOGLYCEMIC DRUGS: ICD-10-CM

## 2022-07-22 ENCOUNTER — APPOINTMENT (OUTPATIENT)
Dept: NEUROLOGY | Facility: CLINIC | Age: 52
End: 2022-07-22

## 2022-08-25 ENCOUNTER — APPOINTMENT (OUTPATIENT)
Dept: NEUROLOGY | Facility: CLINIC | Age: 52
End: 2022-08-25

## 2022-08-25 VITALS
SYSTOLIC BLOOD PRESSURE: 132 MMHG | DIASTOLIC BLOOD PRESSURE: 81 MMHG | OXYGEN SATURATION: 98 % | TEMPERATURE: 98 F | HEIGHT: 65 IN | BODY MASS INDEX: 36.15 KG/M2 | WEIGHT: 217 LBS | HEART RATE: 105 BPM

## 2022-08-25 DIAGNOSIS — H81.10 BENIGN PAROXYSMAL VERTIGO, UNSPECIFIED EAR: ICD-10-CM

## 2022-08-25 DIAGNOSIS — G44.221 CHRONIC TENSION-TYPE HEADACHE, INTRACTABLE: ICD-10-CM

## 2022-08-25 DIAGNOSIS — G43.909 MIGRAINE, UNSPECIFIED, NOT INTRACTABLE, W/OUT STATUS MIGRAINOSUS: ICD-10-CM

## 2022-08-25 DIAGNOSIS — R42 DIZZINESS AND GIDDINESS: ICD-10-CM

## 2022-08-25 DIAGNOSIS — H81.12 BENIGN PAROXYSMAL VERTIGO, LEFT EAR: ICD-10-CM

## 2022-08-25 PROCEDURE — 99214 OFFICE O/P EST MOD 30 MIN: CPT

## 2022-08-25 RX ORDER — AMITRIPTYLINE HYDROCHLORIDE 10 MG/1
10 TABLET, FILM COATED ORAL
Qty: 30 | Refills: 5 | Status: ACTIVE | COMMUNITY
Start: 2022-08-25 | End: 1900-01-01

## 2022-08-25 NOTE — ASSESSMENT
[FreeTextEntry1] : Impression:\par 1.  Right BPPV.\par 2.  Migraines, improved.\par 3.  Chronic headaches, worse.\par 4.  Skull indentations behind ears are symmetrical and I suspect normal.\par \par Plan:\par 1.  Epley performed.\par 2.  CT head for worsening of headaches.\par 3.  Patient reassurance given.\par 4.  Return in 6 months.

## 2022-08-25 NOTE — HISTORY OF PRESENT ILLNESS
[FreeTextEntry1] : Pt presents for f/u of BPPV, migraines, RLS symptoms.\par \par States she feels lots of neck pain and feels indentations in skull behind ears she is concerned about.\par Hasn't had migraine in a while but constant dull headache behind ears.  \par \par \par \par She puts norma-riley around ears and on neck and head.\par \par Has been taking bonine two tabs twice a day\par \par

## 2022-10-19 ENCOUNTER — NON-APPOINTMENT (OUTPATIENT)
Age: 52
End: 2022-10-19

## 2023-05-05 ENCOUNTER — APPOINTMENT (OUTPATIENT)
Dept: NEUROLOGY | Facility: CLINIC | Age: 53
End: 2023-05-05

## 2024-07-24 NOTE — ED ADULT NURSE NOTE - PAIN RATING/NUMBER SCALE (0-10): REST
PCP: Socorro Charles APRN - NP    Last appointment: 03.19.2024      No future appointments.    Requested Prescriptions     Pending Prescriptions Disp Refills    dapagliflozin (FARXIGA) 5 MG tablet 30 tablet 0     Sig: Take 1 tablet by mouth every morning       Prior labs and Blood pressures:  BP Readings from Last 3 Encounters:   03/19/24 99/67   03/11/24 130/79   09/15/23 111/75     Lab Results   Component Value Date/Time     03/11/2024 02:45 PM    K 3.3 03/11/2024 02:45 PM     03/11/2024 02:45 PM    CO2 20 03/11/2024 02:45 PM    BUN 15 03/11/2024 02:45 PM    GFRAA >60 08/01/2022 02:58 PM     Lab Results   Component Value Date/Time    IQY7LRKD 7.6 02/02/2022 11:54 AM     Lab Results   Component Value Date/Time    CHOL 152 09/15/2023 09:38 AM    HDL 42 09/15/2023 09:38 AM    LDL 52.4 09/15/2023 09:38 AM    VLDL 57.6 09/15/2023 09:38 AM     No results found for: \"VITD3\"    Lab Results   Component Value Date/Time    TSH 1.03 06/22/2021 11:42 AM     No future appointments.    Requested Prescriptions     Pending Prescriptions Disp Refills    dapagliflozin (FARXIGA) 5 MG tablet 30 tablet 0     Sig: Take 1 tablet by mouth every morning       Prior labs and Blood pressures:  BP Readings from Last 3 Encounters:   03/19/24 99/67   03/11/24 130/79   09/15/23 111/75     Lab Results   Component Value Date/Time     03/11/2024 02:45 PM    K 3.3 03/11/2024 02:45 PM     03/11/2024 02:45 PM    CO2 20 03/11/2024 02:45 PM    BUN 15 03/11/2024 02:45 PM    GFRAA >60 08/01/2022 02:58 PM     Lab Results   Component Value Date/Time    GNS2VLZB 7.6 02/02/2022 11:54 AM     Lab Results   Component Value Date/Time    CHOL 152 09/15/2023 09:38 AM    HDL 42 09/15/2023 09:38 AM    LDL 52.4 09/15/2023 09:38 AM    VLDL 57.6 09/15/2023 09:38 AM     No results found for: \"VITD3\"    Lab Results   Component Value Date/Time    TSH 1.03 06/22/2021 11:42 AM        3

## 2024-12-26 ENCOUNTER — APPOINTMENT (OUTPATIENT)
Dept: ORTHOPEDIC SURGERY | Facility: CLINIC | Age: 54
End: 2024-12-26
Payer: COMMERCIAL

## 2024-12-26 ENCOUNTER — NON-APPOINTMENT (OUTPATIENT)
Age: 54
End: 2024-12-26

## 2024-12-26 DIAGNOSIS — S92.401A DISPLACED UNSPECIFIED FRACTURE OF RIGHT GREAT TOE, INITIAL ENCOUNTER FOR CLOSED FRACTURE: ICD-10-CM

## 2024-12-26 PROCEDURE — 99203 OFFICE O/P NEW LOW 30 MIN: CPT | Mod: 25

## 2024-12-26 RX ORDER — IBUPROFEN 800 MG/1
800 TABLET, FILM COATED ORAL
Qty: 28 | Refills: 0 | Status: ACTIVE | COMMUNITY
Start: 2024-12-26 | End: 1900-01-01

## 2025-01-23 ENCOUNTER — APPOINTMENT (OUTPATIENT)
Dept: ORTHOPEDIC SURGERY | Facility: CLINIC | Age: 55
End: 2025-01-23
Payer: COMMERCIAL

## 2025-01-23 ENCOUNTER — NON-APPOINTMENT (OUTPATIENT)
Age: 55
End: 2025-01-23

## 2025-01-23 ENCOUNTER — RESULT CHARGE (OUTPATIENT)
Age: 55
End: 2025-01-23

## 2025-01-23 DIAGNOSIS — S92.401A DISPLACED UNSPECIFIED FRACTURE OF RIGHT GREAT TOE, INITIAL ENCOUNTER FOR CLOSED FRACTURE: ICD-10-CM

## 2025-01-23 PROCEDURE — 28490 TREAT BIG TOE FRACTURE: CPT | Mod: RT

## 2025-01-23 PROCEDURE — 99204 OFFICE O/P NEW MOD 45 MIN: CPT | Mod: 57

## 2025-01-23 PROCEDURE — 73660 X-RAY EXAM OF TOE(S): CPT | Mod: RT

## 2025-02-27 ENCOUNTER — APPOINTMENT (OUTPATIENT)
Dept: ORTHOPEDIC SURGERY | Facility: CLINIC | Age: 55
End: 2025-02-27

## 2025-02-27 DIAGNOSIS — S92.401A DISPLACED UNSPECIFIED FRACTURE OF RIGHT GREAT TOE, INITIAL ENCOUNTER FOR CLOSED FRACTURE: ICD-10-CM

## 2025-05-05 ENCOUNTER — APPOINTMENT (OUTPATIENT)
Age: 55
End: 2025-05-05
Payer: COMMERCIAL

## 2025-05-05 DIAGNOSIS — S92.414S: ICD-10-CM

## 2025-05-05 DIAGNOSIS — E11.42 TYPE 2 DIABETES MELLITUS WITH DIABETIC POLYNEUROPATHY: ICD-10-CM

## 2025-05-05 DIAGNOSIS — G89.21 CHRONIC PAIN DUE TO TRAUMA: ICD-10-CM

## 2025-05-05 PROCEDURE — 73630 X-RAY EXAM OF FOOT: CPT

## 2025-05-05 PROCEDURE — 99213 OFFICE O/P EST LOW 20 MIN: CPT | Mod: 25

## 2025-05-05 PROCEDURE — 64450 NJX AA&/STRD OTHER PN/BRANCH: CPT | Mod: T5

## 2025-05-05 RX ORDER — LIDOCAINE HCL/PF 20 MG/ML
2 VIAL (ML) INJECTION
Refills: 0 | Status: COMPLETED | OUTPATIENT
Start: 2025-05-05

## 2025-05-05 RX ORDER — LIDOCAINE 5 G/100G
5 OINTMENT TOPICAL
Qty: 30 | Refills: 5 | Status: ACTIVE | COMMUNITY
Start: 2025-05-05 | End: 1900-01-01

## 2025-05-05 RX ORDER — CYANOCOBALAMIN 1000 UG/ML
1000 INJECTION, SOLUTION INTRAMUSCULAR; SUBCUTANEOUS
Qty: 0 | Refills: 0 | Status: COMPLETED | OUTPATIENT
Start: 2025-05-05

## 2025-05-05 RX ADMIN — CYANOCOBALAMIN 1 MCG/ML: 1000 INJECTION, SOLUTION INTRAMUSCULAR at 00:00

## 2025-05-05 RX ADMIN — LIDOCAINE HYDROCHLORIDE %: 20 INJECTION, SOLUTION INFILTRATION; PERINEURAL at 00:00

## 2025-05-06 PROBLEM — S92.414S: Status: ACTIVE | Noted: 2025-05-06

## 2025-06-14 ENCOUNTER — APPOINTMENT (OUTPATIENT)
Age: 55
End: 2025-06-14